# Patient Record
Sex: FEMALE | URBAN - METROPOLITAN AREA
[De-identification: names, ages, dates, MRNs, and addresses within clinical notes are randomized per-mention and may not be internally consistent; named-entity substitution may affect disease eponyms.]

---

## 2020-07-08 ENCOUNTER — OFFICE VISIT (OUTPATIENT)
Dept: URBAN - METROPOLITAN AREA CLINIC 113 | Facility: CLINIC | Age: 50
End: 2020-07-08

## 2020-07-25 ENCOUNTER — TELEPHONE ENCOUNTER (OUTPATIENT)
Dept: URBAN - METROPOLITAN AREA CLINIC 13 | Facility: CLINIC | Age: 50
End: 2020-07-25

## 2020-07-25 RX ORDER — POLYETHYLENE GLYCOL 3350, SODIUM SULFATE, SODIUM CHLORIDE, POTASSIUM CHLORIDE, ASCORBIC ACID, SODIUM ASCORBATE 140-9-5.2G
MIX AS DIRECTED. POUCH #1 W/2 CUPS WATER AT 5:00PM EVENING BEFORE PROCEDURE. POUCH #2 W/2 CUPS WATER 6 HR PRIOR TO PROCEDURE KIT ORAL
Qty: 946 | Refills: 0 | OUTPATIENT
Start: 2020-05-05 | End: 2020-05-27

## 2020-07-26 ENCOUNTER — TELEPHONE ENCOUNTER (OUTPATIENT)
Dept: URBAN - METROPOLITAN AREA CLINIC 13 | Facility: CLINIC | Age: 50
End: 2020-07-26

## 2020-07-26 RX ORDER — MULTIVITAMIN
TAKE 1 TABLET DAILY TABLET ORAL
Refills: 0 | Status: ACTIVE | COMMUNITY

## 2020-07-26 RX ORDER — DICLOFENAC SODIUM 10 MG/G
APPLY 4 GRAMS TO AFFECTED AREA(S) FOUR TIMES A DAY FOR 7 DAYS GEL TOPICAL
Qty: 100 | Refills: 0 | Status: ACTIVE | COMMUNITY
Start: 2019-11-09

## 2020-07-26 RX ORDER — ALPRAZOLAM 0.5 MG/1
TAKE ONE TABLET BY MOUTH AT BEDTIME THE NIGHT BEFORE PROCEDURE, TAKE O TABLET ORAL
Qty: 3 | Refills: 0 | Status: ACTIVE | COMMUNITY
Start: 2019-10-11

## 2020-07-26 RX ORDER — TRAMADOL HYDROCHLORIDE 50 MG/1
TAKE ONE TO TWO TABLETS BY MOUTH EVERY 4 TO 6 HOURS WHEN NECESSARY FOR TABLET ORAL
Qty: 40 | Refills: 0 | Status: ACTIVE | COMMUNITY
Start: 2019-12-17

## 2020-07-26 RX ORDER — PANTOPRAZOLE SODIUM 40 MG/1
TAKE 1 TABLET DAILY TABLET, DELAYED RELEASE ORAL
Qty: 30 | Refills: 5 | Status: ACTIVE | COMMUNITY
Start: 2020-03-20

## 2020-07-26 RX ORDER — HYDROCODONE BITARTRATE AND ACETAMINOPHEN 7.5; 325 MG/1; MG/1
TAKE ONE TO TWO TABLETS BY MOUTH EVERY 6 HOURS AS NEEDED FOR PAIN TABLET ORAL
Qty: 60 | Refills: 0 | Status: ACTIVE | COMMUNITY
Start: 2019-12-23

## 2020-07-26 RX ORDER — CALCIUM CARBONATE 500(1250)
TAKE 1 TABLET DAILY TABLET ORAL
Refills: 0 | Status: ACTIVE | COMMUNITY

## 2020-07-26 RX ORDER — METHYLPREDNISOLONE 4 MG/1
TAKE AS DIRECTED TABLET ORAL
Qty: 21 | Refills: 0 | Status: ACTIVE | COMMUNITY
Start: 2019-10-18

## 2020-07-26 RX ORDER — METHYLPREDNISOLONE 4 MG/1
TAKE AS DIRECTED TABLET ORAL
Qty: 21 | Refills: 0 | Status: ACTIVE | COMMUNITY
Start: 2020-01-02

## 2025-02-28 ENCOUNTER — OFFICE VISIT (OUTPATIENT)
Dept: URBAN - METROPOLITAN AREA CLINIC 113 | Facility: CLINIC | Age: 55
End: 2025-02-28
Payer: OTHER GOVERNMENT

## 2025-02-28 ENCOUNTER — DASHBOARD ENCOUNTERS (OUTPATIENT)
Age: 55
End: 2025-02-28

## 2025-02-28 VITALS — RESPIRATION RATE: 20 BRPM | WEIGHT: 226 LBS | TEMPERATURE: 98.1 F | HEIGHT: 64 IN | BODY MASS INDEX: 38.58 KG/M2

## 2025-02-28 DIAGNOSIS — R11.0 NAUSEA: ICD-10-CM

## 2025-02-28 DIAGNOSIS — R10.13 EPIGASTRIC ABDOMINAL PAIN: ICD-10-CM

## 2025-02-28 PROCEDURE — 99203 OFFICE O/P NEW LOW 30 MIN: CPT | Performed by: NURSE PRACTITIONER

## 2025-02-28 RX ORDER — METHYLPREDNISOLONE 4 MG/1
TAKE AS DIRECTED TABLET ORAL
Qty: 21 | Refills: 0 | Status: ACTIVE | COMMUNITY
Start: 2019-10-18

## 2025-02-28 RX ORDER — CALCIUM CARBONATE 500(1250)
TAKE 1 TABLET DAILY TABLET ORAL
Refills: 0 | Status: ACTIVE | COMMUNITY

## 2025-02-28 RX ORDER — ANASTROZOLE 1 MG/1
1 TABLET TABLET, FILM COATED ORAL ONCE A DAY
Status: ACTIVE | COMMUNITY

## 2025-02-28 RX ORDER — TRAMADOL HYDROCHLORIDE 50 MG/1
TAKE ONE TO TWO TABLETS BY MOUTH EVERY 4 TO 6 HOURS WHEN NECESSARY FOR TABLET, FILM COATED ORAL
Qty: 40 | Refills: 0 | Status: ON HOLD | COMMUNITY
Start: 2019-12-17

## 2025-02-28 RX ORDER — PANTOPRAZOLE SODIUM 40 MG/1
TAKE 1 TABLET DAILY TABLET, DELAYED RELEASE ORAL
Qty: 30 | Refills: 5 | Status: ACTIVE | COMMUNITY
Start: 2020-03-20

## 2025-02-28 RX ORDER — MULTIVITAMIN
TAKE 1 TABLET DAILY TABLET ORAL
Refills: 0 | Status: ACTIVE | COMMUNITY

## 2025-02-28 RX ORDER — PANTOPRAZOLE SODIUM 40 MG/1
TAKE 1 TABLET DAILY TABLET, DELAYED RELEASE ORAL
OUTPATIENT
Start: 2020-03-20

## 2025-02-28 RX ORDER — ALBUTEROL SULFATE 90 UG/1
1 PUFF AS NEEDED AEROSOL, METERED RESPIRATORY (INHALATION)
Status: ACTIVE | COMMUNITY

## 2025-02-28 RX ORDER — UREA 10 %
1 TABLET LOTION (ML) TOPICAL ONCE A DAY
Status: ACTIVE | COMMUNITY

## 2025-02-28 RX ORDER — MONTELUKAST 10 MG/1
1 TABLET TABLET, FILM COATED ORAL ONCE A DAY
Status: ACTIVE | COMMUNITY

## 2025-02-28 RX ORDER — ALPRAZOLAM 0.5 MG/1
TAKE ONE TABLET BY MOUTH AT BEDTIME THE NIGHT BEFORE PROCEDURE, TAKE O TABLET ORAL
Qty: 3 | Refills: 0 | Status: ACTIVE | COMMUNITY
Start: 2019-10-11

## 2025-02-28 RX ORDER — AZITHROMYCIN MONOHYDRATE 250 MG/1
AS DIRECTED TABLET, FILM COATED ORAL
Status: ACTIVE | COMMUNITY

## 2025-02-28 RX ORDER — DULOXETINE 30 MG/1
3 CAPSULES CAPSULE, DELAYED RELEASE PELLETS ORAL ONCE A DAY
Status: ACTIVE | COMMUNITY

## 2025-02-28 RX ORDER — DICLOFENAC SODIUM TOPICAL GEL, 1% 10 MG/G
APPLY 4 GRAMS TO AFFECTED AREA(S) FOUR TIMES A DAY FOR 7 DAYS GEL TOPICAL
Qty: 100 | Refills: 0 | Status: ACTIVE | COMMUNITY
Start: 2019-11-09

## 2025-02-28 RX ORDER — CLONIDINE HYDROCHLORIDE 0.1 MG/1
3 TABLETS TABLET ORAL ONCE A DAY
Status: ACTIVE | COMMUNITY

## 2025-02-28 RX ORDER — HYDROCODONE BITARTRATE AND ACETAMINOPHEN 7.5; 325 MG/1; MG/1
TAKE ONE TO TWO TABLETS BY MOUTH EVERY 6 HOURS AS NEEDED FOR PAIN TABLET ORAL
Qty: 60 | Refills: 0 | Status: ON HOLD | COMMUNITY
Start: 2019-12-23

## 2025-02-28 NOTE — HPI-TODAY'S VISIT:
53 yo woman presenting for follow up after [place] [facility] [place] complaints of epigastric abdominal pain with nausea but not vomiting. Pain was worsened with eating. Abdominal US 2/11/25 was unremarkable. CT a/p with IV contrast was unremarkable. Labs showed a normal CBC, CMP, lipase and UA.  She is no longer experiencing any abdominal pain. She is being treated as if she has a stomach ulcer as a source for abdominal pain. She was started on pantoprazole 40 mg daily, sucralfate 1 gm four times daily and ondansetron as needed for nausea.  There is no trouble with swallowing, or heartburn. Abdominal pain is improved. No nausea or vomiting. There is no melena. No red blood per rectum. She has bowel movements not as often as she was having them since starting sucralfate. She estimates having bowel movements once per week.

## 2025-04-01 ENCOUNTER — OFFICE VISIT (OUTPATIENT)
Dept: URBAN - METROPOLITAN AREA CLINIC 113 | Facility: CLINIC | Age: 55
End: 2025-04-01
Payer: OTHER GOVERNMENT

## 2025-04-01 DIAGNOSIS — Z86.0102 HISTORY OF HYPERPLASTIC POLYP OF COLON: ICD-10-CM

## 2025-04-01 DIAGNOSIS — R10.13 EPIGASTRIC ABDOMINAL PAIN: ICD-10-CM

## 2025-04-01 PROCEDURE — 99213 OFFICE O/P EST LOW 20 MIN: CPT | Performed by: NURSE PRACTITIONER

## 2025-04-01 RX ORDER — MULTIVITAMIN
TAKE 1 TABLET DAILY TABLET ORAL
Refills: 0 | Status: ACTIVE | COMMUNITY

## 2025-04-01 RX ORDER — ALPRAZOLAM 0.5 MG/1
TAKE ONE TABLET BY MOUTH AT BEDTIME THE NIGHT BEFORE PROCEDURE, TAKE O TABLET ORAL
Qty: 3 | Refills: 0 | Status: ACTIVE | COMMUNITY
Start: 2019-10-11

## 2025-04-01 RX ORDER — CALCIUM CARBONATE 500(1250)
TAKE 1 TABLET DAILY TABLET ORAL
Refills: 0 | Status: ACTIVE | COMMUNITY

## 2025-04-01 RX ORDER — ANASTROZOLE 1 MG/1
1 TABLET TABLET, FILM COATED ORAL ONCE A DAY
Status: ACTIVE | COMMUNITY

## 2025-04-01 RX ORDER — MONTELUKAST 10 MG/1
1 TABLET TABLET, FILM COATED ORAL ONCE A DAY
Status: ACTIVE | COMMUNITY

## 2025-04-01 RX ORDER — METHYLPREDNISOLONE 4 MG/1
TAKE AS DIRECTED TABLET ORAL
Qty: 21 | Refills: 0 | Status: ON HOLD | COMMUNITY
Start: 2019-10-18

## 2025-04-01 RX ORDER — TRAMADOL HYDROCHLORIDE 50 MG/1
TAKE ONE TO TWO TABLETS BY MOUTH EVERY 4 TO 6 HOURS WHEN NECESSARY FOR TABLET, FILM COATED ORAL
Qty: 40 | Refills: 0 | Status: ON HOLD | COMMUNITY
Start: 2019-12-17

## 2025-04-01 RX ORDER — UREA 10 %
1 TABLET LOTION (ML) TOPICAL ONCE A DAY
Status: ACTIVE | COMMUNITY

## 2025-04-01 RX ORDER — AZITHROMYCIN MONOHYDRATE 250 MG/1
AS DIRECTED TABLET, FILM COATED ORAL
Status: ACTIVE | COMMUNITY

## 2025-04-01 RX ORDER — CLONIDINE HYDROCHLORIDE 0.1 MG/1
3 TABLETS TABLET ORAL ONCE A DAY
Status: ACTIVE | COMMUNITY

## 2025-04-01 RX ORDER — DULOXETINE 30 MG/1
3 CAPSULES CAPSULE, DELAYED RELEASE PELLETS ORAL ONCE A DAY
Status: ACTIVE | COMMUNITY

## 2025-04-01 RX ORDER — HYDROCODONE BITARTRATE AND ACETAMINOPHEN 7.5; 325 MG/1; MG/1
TAKE ONE TO TWO TABLETS BY MOUTH EVERY 6 HOURS AS NEEDED FOR PAIN TABLET ORAL
Qty: 60 | Refills: 0 | Status: ON HOLD | COMMUNITY
Start: 2019-12-23

## 2025-04-01 RX ORDER — ALBUTEROL SULFATE 90 UG/1
1 PUFF AS NEEDED AEROSOL, METERED RESPIRATORY (INHALATION)
Status: ACTIVE | COMMUNITY

## 2025-04-01 RX ORDER — DICLOFENAC SODIUM TOPICAL GEL, 1% 10 MG/G
APPLY 4 GRAMS TO AFFECTED AREA(S) FOUR TIMES A DAY FOR 7 DAYS GEL TOPICAL
Qty: 100 | Refills: 0 | Status: ACTIVE | COMMUNITY
Start: 2019-11-09

## 2025-04-01 RX ORDER — PANTOPRAZOLE SODIUM 40 MG/1
TAKE 1 TABLET DAILY TABLET, DELAYED RELEASE ORAL
Status: ON HOLD | COMMUNITY
Start: 2020-03-20

## 2025-04-01 NOTE — HPI-TODAY'S VISIT:
54-year-old woman with a history of epigastric abdominal pain and nausea, presenting for 8-week follow-up.  She was seen in the office 2/28/2025, and was noted to have a history of erosive gastritis.  She had recently been evaluated in the emergency department for epigastric abdominal pain, nausea.  We are questioning if her abdominal pain has been exacerbated by recurrent grief and stress in the setting of the loss of her son.  Her son passed away about 8 years ago from a car accident.  She was asked to continue pantoprazole 40 mg daily.  She is doing much better. She truly thinks her abdominal pain episode was related to stress from reading the report from her son's death. She has not had any abdominal pain, nausea or vomiting since this episode. No weight loss. Appetite is fair. No heartburn or trouble with swallowing. Bowels are moving regularly without blood or melena.